# Patient Record
Sex: MALE | Race: WHITE | NOT HISPANIC OR LATINO | URBAN - METROPOLITAN AREA
[De-identification: names, ages, dates, MRNs, and addresses within clinical notes are randomized per-mention and may not be internally consistent; named-entity substitution may affect disease eponyms.]

---

## 2017-05-21 ENCOUNTER — EMERGENCY (EMERGENCY)
Facility: HOSPITAL | Age: 62
LOS: 0 days | Discharge: HOME | End: 2017-05-21
Attending: EMERGENCY MEDICINE | Admitting: EMERGENCY MEDICINE
Payer: SELF-PAY

## 2017-05-21 VITALS
OXYGEN SATURATION: 100 % | SYSTOLIC BLOOD PRESSURE: 138 MMHG | TEMPERATURE: 98 F | RESPIRATION RATE: 18 BRPM | DIASTOLIC BLOOD PRESSURE: 89 MMHG | HEART RATE: 60 BPM

## 2017-05-21 VITALS
RESPIRATION RATE: 17 BRPM | DIASTOLIC BLOOD PRESSURE: 84 MMHG | HEIGHT: 72 IN | OXYGEN SATURATION: 100 % | HEART RATE: 65 BPM | WEIGHT: 166.89 LBS | SYSTOLIC BLOOD PRESSURE: 173 MMHG | TEMPERATURE: 98 F
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
## 2017-05-21 DIAGNOSIS — S02.2XXA FRACTURE OF NASAL BONES, INITIAL ENCOUNTER FOR CLOSED FRACTURE: ICD-10-CM
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
## 2017-05-21 DIAGNOSIS — S02.92XA UNSPECIFIED FRACTURE OF FACIAL BONES, INITIAL ENCOUNTER FOR CLOSED FRACTURE: ICD-10-CM
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
## 2017-05-21 PROCEDURE — 76377 3D RENDER W/INTRP POSTPROCES: CPT | Mod: 26

## 2017-05-21 PROCEDURE — 73130 X-RAY EXAM OF HAND: CPT | Mod: 26,RT

## 2017-05-21 PROCEDURE — 99285 EMERGENCY DEPT VISIT HI MDM: CPT

## 2017-05-21 PROCEDURE — 70486 CT MAXILLOFACIAL W/O DYE: CPT | Mod: 26

## 2017-05-21 PROCEDURE — 70450 CT HEAD/BRAIN W/O DYE: CPT | Mod: 26

## 2017-05-21 RX ORDER — TETANUS TOXOID, REDUCED DIPHTHERIA TOXOID AND ACELLULAR PERTUSSIS VACCINE, ADSORBED 5; 2.5; 8; 8; 2.5 [IU]/.5ML; [IU]/.5ML; UG/.5ML; UG/.5ML; UG/.5ML
0.5 SUSPENSION INTRAMUSCULAR ONCE
Qty: 0 | Refills: 0 | Status: COMPLETED | OUTPATIENT
Start: 2017-05-21 | End: 2017-05-21

## 2017-05-21 RX ORDER — ONDANSETRON 8 MG/1
1 TABLET, FILM COATED ORAL
Qty: 9 | Refills: 0 | OUTPATIENT
Start: 2017-05-21 | End: 2017-05-24

## 2017-05-21 RX ORDER — ACETAMINOPHEN 500 MG
650 TABLET ORAL ONCE
Qty: 0 | Refills: 0 | Status: COMPLETED | OUTPATIENT
Start: 2017-05-21 | End: 2017-05-21

## 2017-05-21 RX ORDER — ONDANSETRON 8 MG/1
4 TABLET, FILM COATED ORAL ONCE
Qty: 0 | Refills: 0 | Status: COMPLETED | OUTPATIENT
Start: 2017-05-21 | End: 2017-05-21

## 2017-05-21 RX ADMIN — Medication 650 MILLIGRAM(S): at 22:15

## 2017-05-21 RX ADMIN — Medication 1 TABLET(S): at 20:59

## 2017-05-21 RX ADMIN — Medication 650 MILLIGRAM(S): at 21:07

## 2017-05-21 RX ADMIN — TETANUS TOXOID, REDUCED DIPHTHERIA TOXOID AND ACELLULAR PERTUSSIS VACCINE, ADSORBED 0.5 MILLILITER(S): 5; 2.5; 8; 8; 2.5 SUSPENSION INTRAMUSCULAR at 21:07

## 2017-05-21 RX ADMIN — ONDANSETRON 4 MILLIGRAM(S): 8 TABLET, FILM COATED ORAL at 19:16

## 2017-05-21 NOTE — ED STATDOCS - DETAILS:
I, Skyler Palm,  performed the initial face to face bedside interview with this patient regarding history of present illness, review of symptoms and relevant past medical, social and family history.  I completed an independent physical examination.  I was the initial provider who evaluated this patient. I have signed out the follow up of any pending tests (i.e. labs, radiological studies) to the ACP.  I have communicated the patient’s plan of care and disposition with the ACP.  The history, relevant review of systems, past medical and surgical history, medical decision making, and physical examination was documented by the scribe in my presence and I attest to the accuracy of the documentation.

## 2017-05-21 NOTE — ED STATDOCS - OBJECTIVE STATEMENT
62 y/o M presents to ED s/p fall. [place] cement slipped causing him to fall striking his face on the ground. Pt is c/o abrasions on his face and right thumb pain. No LOC. Pt denies SOB, v/d, CP. Pt states that he feels nauseas.

## 2017-05-21 NOTE — ED STATDOCS - PROGRESS NOTE DETAILS
61 yr. old male PMH: presents to ED with abrasions to face and right thumb after falling on face walking [place] stairs [place] train station today. No head injury or LOC. Seen and examined by attending in Intake. Plan: X-Ray .  Will F/U with results and  re evaluate. Jocelyn NIELSON Contacted Dr. Nogueira for consult on facial fractures, reviewed Impression as per Radiologist report. Directed to Rx. Augmentin and F/U in office. Called Dr. Peace in regard to nasal and orbital fractures. Will Calll back for further direction. Jocelyn NP Dr. Peace returned call to ED and instructed to have patient F/U in office and bring CD of CT scans. Jocelyn NIELSON

## 2017-05-21 NOTE — ED STATDOCS - NS ED MD SCRIBE ATTENDING SCRIBE SECTIONS
VITAL SIGNS( Pullset)/REVIEW OF SYSTEMS/PROGRESS NOTE/HISTORY OF PRESENT ILLNESS/DISPOSITION/PHYSICAL EXAM/PAST MEDICAL/SURGICAL/SOCIAL HISTORY/RESULTS

## 2017-05-21 NOTE — ED STATDOCS - SKIN, MLM
Left infraorbital ecchymosis, multiple abrasions over maxillary and forehead, edema and ecchymosis on distal left thumb.

## 2020-10-19 NOTE — ED STATDOCS - CHPI ED LOCATION
Take Tylenol 650 mg every 4 hours as needed for pain.  You can also use ibuprofen 400 mg every 6 hours as needed for pain.  See orthopedics in 1 week for reassessment.   right thumb, face

## 2020-12-16 NOTE — ED STATDOCS - DISCHARGE DATE
PT would like to know if Dr. Vanessa Malhotra wants to talk with her sooner than her Feb appt. She had her US of her neck completed and there was a little something there.  [phone] 21-May-2017